# Patient Record
Sex: FEMALE | Race: WHITE | NOT HISPANIC OR LATINO | URBAN - METROPOLITAN AREA
[De-identification: names, ages, dates, MRNs, and addresses within clinical notes are randomized per-mention and may not be internally consistent; named-entity substitution may affect disease eponyms.]

---

## 2017-01-19 ENCOUNTER — FOLLOW UP ESTABLISHED (OUTPATIENT)
Dept: URBAN - METROPOLITAN AREA CLINIC 64 | Facility: CLINIC | Age: 77
End: 2017-01-19
Payer: MEDICARE

## 2017-01-19 DIAGNOSIS — H52.11 MYOPIA, RIGHT EYE: ICD-10-CM

## 2017-01-19 PROCEDURE — 92014 COMPRE OPH EXAM EST PT 1/>: CPT | Performed by: OPTOMETRIST

## 2017-01-19 PROCEDURE — 92015 DETERMINE REFRACTIVE STATE: CPT | Performed by: OPTOMETRIST

## 2017-01-19 ASSESSMENT — INTRAOCULAR PRESSURE
OS: 14
OD: 13

## 2017-01-19 ASSESSMENT — VISUAL ACUITY
OS: 20/20
OD: 20/20

## 2017-01-19 ASSESSMENT — KERATOMETRY
OS: 44.53
OD: 44.65

## 2017-09-22 ENCOUNTER — FOLLOW UP ESTABLISHED (OUTPATIENT)
Dept: URBAN - METROPOLITAN AREA CLINIC 64 | Facility: CLINIC | Age: 77
End: 2017-09-22
Payer: MEDICARE

## 2017-09-22 PROCEDURE — 92012 INTRM OPH EXAM EST PATIENT: CPT | Performed by: OPTOMETRIST

## 2017-09-22 RX ORDER — NEOMYCIN SULFATE, POLYMYXIN B SULFATE AND DEXAMETHASONE 3.5; 10000; 1 MG/ML; [USP'U]/ML; MG/ML
SUSPENSION OPHTHALMIC
Qty: 1 | Refills: 0 | Status: INACTIVE
Start: 2017-09-22 | End: 2018-05-10

## 2017-09-22 ASSESSMENT — INTRAOCULAR PRESSURE
OS: 12
OD: 12

## 2017-09-28 ENCOUNTER — FOLLOW UP ESTABLISHED (OUTPATIENT)
Dept: URBAN - METROPOLITAN AREA CLINIC 64 | Facility: CLINIC | Age: 77
End: 2017-09-28
Payer: MEDICARE

## 2017-09-28 PROCEDURE — 99213 OFFICE O/P EST LOW 20 MIN: CPT | Performed by: OPTOMETRIST

## 2017-09-28 ASSESSMENT — INTRAOCULAR PRESSURE
OD: 15
OS: 12

## 2017-10-25 ENCOUNTER — FOLLOW UP ESTABLISHED (OUTPATIENT)
Dept: URBAN - METROPOLITAN AREA CLINIC 64 | Facility: CLINIC | Age: 77
End: 2017-10-25
Payer: MEDICARE

## 2017-10-25 PROCEDURE — 99213 OFFICE O/P EST LOW 20 MIN: CPT | Performed by: OPTOMETRIST

## 2017-10-25 ASSESSMENT — INTRAOCULAR PRESSURE
OD: 10
OS: 10

## 2017-11-01 ENCOUNTER — FOLLOW UP ESTABLISHED (OUTPATIENT)
Dept: URBAN - METROPOLITAN AREA CLINIC 64 | Facility: CLINIC | Age: 77
End: 2017-11-01
Payer: MEDICARE

## 2017-11-01 PROCEDURE — 99213 OFFICE O/P EST LOW 20 MIN: CPT | Performed by: OPTOMETRIST

## 2017-11-01 ASSESSMENT — INTRAOCULAR PRESSURE
OD: 10
OS: 10

## 2017-11-15 ENCOUNTER — FOLLOW UP ESTABLISHED (OUTPATIENT)
Dept: URBAN - METROPOLITAN AREA CLINIC 64 | Facility: CLINIC | Age: 77
End: 2017-11-15
Payer: MEDICARE

## 2017-11-15 PROCEDURE — 99213 OFFICE O/P EST LOW 20 MIN: CPT | Performed by: OPTOMETRIST

## 2017-11-15 ASSESSMENT — INTRAOCULAR PRESSURE
OD: 12
OS: 12

## 2018-05-10 ENCOUNTER — FOLLOW UP ESTABLISHED (OUTPATIENT)
Dept: URBAN - METROPOLITAN AREA CLINIC 64 | Facility: CLINIC | Age: 78
End: 2018-05-10
Payer: MEDICARE

## 2018-05-10 DIAGNOSIS — H52.13 MYOPIA, BILATERAL: ICD-10-CM

## 2018-05-10 DIAGNOSIS — H26.493 OTHER SECONDARY CATARACT, BILATERAL: ICD-10-CM

## 2018-05-10 PROCEDURE — 92015 DETERMINE REFRACTIVE STATE: CPT | Performed by: OPTOMETRIST

## 2018-05-10 PROCEDURE — 92134 CPTRZ OPH DX IMG PST SGM RTA: CPT | Performed by: OPHTHALMOLOGY

## 2018-05-10 PROCEDURE — 92014 COMPRE OPH EXAM EST PT 1/>: CPT | Performed by: OPTOMETRIST

## 2018-05-10 PROCEDURE — 92134 CPTRZ OPH DX IMG PST SGM RTA: CPT | Performed by: OPTOMETRIST

## 2018-05-10 PROCEDURE — 92004 COMPRE OPH EXAM NEW PT 1/>: CPT | Performed by: OPHTHALMOLOGY

## 2018-05-10 PROCEDURE — 67028 INJECTION EYE DRUG: CPT | Performed by: OPHTHALMOLOGY

## 2018-05-10 ASSESSMENT — INTRAOCULAR PRESSURE
OS: 11
OD: 8
OD: 8
OS: 11

## 2018-05-10 ASSESSMENT — KERATOMETRY
OD: 44.60
OS: 44.35

## 2018-05-10 ASSESSMENT — VISUAL ACUITY
OD: 20/25
OS: 20/20

## 2018-05-22 ENCOUNTER — FOLLOW UP ESTABLISHED (OUTPATIENT)
Dept: URBAN - METROPOLITAN AREA CLINIC 64 | Facility: CLINIC | Age: 78
End: 2018-05-22
Payer: MEDICARE

## 2018-05-22 PROCEDURE — 92012 INTRM OPH EXAM EST PATIENT: CPT | Performed by: OPTOMETRIST

## 2018-05-22 RX ORDER — LOTEPREDNOL ETABONATE 5 MG/ML
0.5 % SUSPENSION/ DROPS OPHTHALMIC
Qty: 1 | Refills: 0 | Status: INACTIVE
Start: 2018-05-22 | End: 2018-06-06

## 2018-05-22 ASSESSMENT — INTRAOCULAR PRESSURE
OD: 8
OS: 9

## 2018-05-30 ENCOUNTER — FOLLOW UP ESTABLISHED (OUTPATIENT)
Dept: URBAN - METROPOLITAN AREA CLINIC 64 | Facility: CLINIC | Age: 78
End: 2018-05-30
Payer: MEDICARE

## 2018-05-30 DIAGNOSIS — H16.223 KERATOCONJUNCT SICCA, NOT SPECIFIED AS SJOGREN'S, BILATERAL: Primary | ICD-10-CM

## 2018-05-30 PROCEDURE — 99213 OFFICE O/P EST LOW 20 MIN: CPT | Performed by: OPTOMETRIST

## 2018-05-30 RX ORDER — LIFITEGRAST 50 MG/ML
5 % SOLUTION/ DROPS OPHTHALMIC
Qty: 90 | Refills: 0 | Status: INACTIVE
Start: 2018-05-30 | End: 2018-07-13

## 2018-06-06 ENCOUNTER — FOLLOW UP ESTABLISHED (OUTPATIENT)
Dept: URBAN - METROPOLITAN AREA CLINIC 10 | Facility: CLINIC | Age: 78
End: 2018-06-06
Payer: MEDICARE

## 2018-06-06 PROCEDURE — 67028 INJECTION EYE DRUG: CPT | Performed by: OPHTHALMOLOGY

## 2018-06-06 PROCEDURE — 92134 CPTRZ OPH DX IMG PST SGM RTA: CPT | Performed by: OPHTHALMOLOGY

## 2018-06-06 ASSESSMENT — INTRAOCULAR PRESSURE
OD: 11
OS: 10

## 2018-06-19 ENCOUNTER — FOLLOW UP ESTABLISHED (OUTPATIENT)
Dept: URBAN - METROPOLITAN AREA CLINIC 64 | Facility: CLINIC | Age: 78
End: 2018-06-19
Payer: MEDICARE

## 2018-06-19 PROCEDURE — 99213 OFFICE O/P EST LOW 20 MIN: CPT | Performed by: OPTOMETRIST

## 2018-06-19 ASSESSMENT — INTRAOCULAR PRESSURE
OS: 8
OD: 10

## 2018-07-05 ENCOUNTER — FOLLOW UP ESTABLISHED (OUTPATIENT)
Dept: URBAN - METROPOLITAN AREA CLINIC 64 | Facility: CLINIC | Age: 78
End: 2018-07-05
Payer: MEDICARE

## 2018-07-05 PROCEDURE — 67028 INJECTION EYE DRUG: CPT | Performed by: OPHTHALMOLOGY

## 2018-07-05 PROCEDURE — 92134 CPTRZ OPH DX IMG PST SGM RTA: CPT | Performed by: OPHTHALMOLOGY

## 2018-07-05 ASSESSMENT — INTRAOCULAR PRESSURE
OS: 9
OD: 8

## 2018-08-16 ENCOUNTER — FOLLOW UP ESTABLISHED (OUTPATIENT)
Dept: URBAN - METROPOLITAN AREA CLINIC 64 | Facility: CLINIC | Age: 78
End: 2018-08-16
Payer: MEDICARE

## 2018-08-16 DIAGNOSIS — H35.3211 EXDTVE AGE-REL MCLR DEGN, RIGHT EYE, WITH ACTV CHRDL NEOVAS: Primary | ICD-10-CM

## 2018-08-16 PROCEDURE — 92004 COMPRE OPH EXAM NEW PT 1/>: CPT | Performed by: OPHTHALMOLOGY

## 2018-08-16 PROCEDURE — 92235 FLUORESCEIN ANGRPH MLTIFRAME: CPT | Performed by: OPHTHALMOLOGY

## 2018-08-16 PROCEDURE — 92014 COMPRE OPH EXAM EST PT 1/>: CPT | Performed by: OPHTHALMOLOGY

## 2018-08-16 PROCEDURE — 92134 CPTRZ OPH DX IMG PST SGM RTA: CPT | Performed by: OPHTHALMOLOGY

## 2018-08-16 ASSESSMENT — INTRAOCULAR PRESSURE
OS: 12
OD: 12

## 2018-11-08 ENCOUNTER — RX CHECK (OUTPATIENT)
Dept: URBAN - METROPOLITAN AREA CLINIC 64 | Facility: CLINIC | Age: 78
End: 2018-11-08
Payer: MEDICARE

## 2018-11-08 PROCEDURE — 92014 COMPRE OPH EXAM EST PT 1/>: CPT | Performed by: OPHTHALMOLOGY

## 2018-11-08 PROCEDURE — 92134 CPTRZ OPH DX IMG PST SGM RTA: CPT | Performed by: OPHTHALMOLOGY

## 2018-11-08 ASSESSMENT — INTRAOCULAR PRESSURE
OD: 13
OS: 11

## 2019-01-31 ENCOUNTER — FOLLOW UP ESTABLISHED (OUTPATIENT)
Dept: URBAN - METROPOLITAN AREA CLINIC 64 | Facility: CLINIC | Age: 79
End: 2019-01-31
Payer: MEDICARE

## 2019-01-31 PROCEDURE — 92014 COMPRE OPH EXAM EST PT 1/>: CPT | Performed by: OPHTHALMOLOGY

## 2019-01-31 PROCEDURE — 92134 CPTRZ OPH DX IMG PST SGM RTA: CPT | Performed by: OPHTHALMOLOGY

## 2019-01-31 ASSESSMENT — INTRAOCULAR PRESSURE
OD: 10
OS: 12

## 2021-09-29 ENCOUNTER — OFFICE VISIT (OUTPATIENT)
Dept: URBAN - METROPOLITAN AREA CLINIC 15 | Facility: CLINIC | Age: 81
End: 2021-09-29
Payer: MEDICARE

## 2021-09-29 DIAGNOSIS — H35.3131 NONEXUDATIVE AGE-RELATED MACULAR DEGENERATION, BILATERAL, EARLY DRY STAGE: ICD-10-CM

## 2021-09-29 DIAGNOSIS — E11.9 TYPE 2 DIABETES MELLITUS WITHOUT COMPLICATIONS: Primary | ICD-10-CM

## 2021-09-29 PROCEDURE — 92134 CPTRZ OPH DX IMG PST SGM RTA: CPT | Performed by: OPHTHALMOLOGY

## 2021-09-29 PROCEDURE — 92014 COMPRE OPH EXAM EST PT 1/>: CPT | Performed by: OPHTHALMOLOGY

## 2021-09-29 ASSESSMENT — INTRAOCULAR PRESSURE
OS: 15
OS: 13
OD: 18
OD: 14

## 2021-09-29 NOTE — IMPRESSION/PLAN
Impression: Type 2 diabetes mellitus without complications: I98.2. Plan: Diabetes: no background retinopathy, no signs of neovascularization noted. Discussed ocular and systemic benefits of blood sugar control. MAC OCT obtained today and reviewed with patient, no DME OU.

## 2023-04-19 ENCOUNTER — OFFICE VISIT (OUTPATIENT)
Dept: URBAN - METROPOLITAN AREA CLINIC 15 | Facility: CLINIC | Age: 83
End: 2023-04-19
Payer: MEDICARE

## 2023-04-19 DIAGNOSIS — H35.3111 NEXDTVE AGE-RELATED MCLR DEGN, RIGHT EYE, EARLY DRY STAGE: ICD-10-CM

## 2023-04-19 DIAGNOSIS — E11.9 TYPE 2 DIABETES MELLITUS WITHOUT COMPLICATIONS: Primary | ICD-10-CM

## 2023-04-19 PROCEDURE — 92134 CPTRZ OPH DX IMG PST SGM RTA: CPT | Performed by: OPHTHALMOLOGY

## 2023-04-19 PROCEDURE — 99214 OFFICE O/P EST MOD 30 MIN: CPT | Performed by: OPHTHALMOLOGY

## 2023-04-19 ASSESSMENT — INTRAOCULAR PRESSURE
OS: 15
OD: 15

## 2023-04-19 NOTE — IMPRESSION/PLAN
Impression: Type 2 diabetes mellitus without complications: A94.1. Plan: Diabetes: no background retinopathy, no signs of neovascularization noted. Discussed ocular and systemic benefits of blood sugar control. MAC OCT obtained today and reviewed with patient, no DME OU.

## 2024-09-09 ENCOUNTER — OFFICE VISIT (OUTPATIENT)
Dept: URBAN - METROPOLITAN AREA CLINIC 15 | Facility: CLINIC | Age: 84
End: 2024-09-09
Payer: MEDICARE

## 2024-09-09 DIAGNOSIS — Z96.1 PRESENCE OF PSEUDOPHAKIA: ICD-10-CM

## 2024-09-09 DIAGNOSIS — H02.88A MEIBOMIAN GLAND DYSFNCT RIGHT EYE, UPPER AND LOWER EYELIDS: ICD-10-CM

## 2024-09-09 DIAGNOSIS — H16.223 KERATOCONJUNCTIVITIS SICCA, NOT SPECIFIED AS SJ”GREN'S, BILATERAL: ICD-10-CM

## 2024-09-09 DIAGNOSIS — H02.88B MEIBOMIAN GLAND DYSFNCT LEFT EYE, UPPER AND LOWER EYELIDS: ICD-10-CM

## 2024-09-09 DIAGNOSIS — H00.015 HORDEOLUM EXTERNUM LEFT LOWER EYELID: Primary | ICD-10-CM

## 2024-09-09 PROCEDURE — 99204 OFFICE O/P NEW MOD 45 MIN: CPT

## 2024-09-09 RX ORDER — DOXYCYCLINE HYCLATE 100 MG/1
100 MG TABLET, COATED ORAL
Qty: 16 | Refills: 0 | Status: ACTIVE
Start: 2024-09-09

## 2024-09-09 ASSESSMENT — INTRAOCULAR PRESSURE
OD: 19
OS: 15

## 2024-09-12 ENCOUNTER — OFFICE VISIT (OUTPATIENT)
Dept: URBAN - METROPOLITAN AREA CLINIC 15 | Facility: CLINIC | Age: 84
End: 2024-09-12
Payer: MEDICARE

## 2024-09-12 PROCEDURE — 99213 OFFICE O/P EST LOW 20 MIN: CPT

## 2024-09-12 ASSESSMENT — INTRAOCULAR PRESSURE
OS: 12
OD: 17

## 2024-09-26 ENCOUNTER — OFFICE VISIT (OUTPATIENT)
Dept: URBAN - METROPOLITAN AREA CLINIC 15 | Facility: CLINIC | Age: 84
End: 2024-09-26
Payer: MEDICARE

## 2024-09-26 DIAGNOSIS — H00.015 HORDEOLUM EXTERNUM LEFT LOWER EYELID: Primary | ICD-10-CM

## 2024-09-26 DIAGNOSIS — H02.88A MEIBOMIAN GLAND DYSFNCT RIGHT EYE, UPPER AND LOWER EYELIDS: ICD-10-CM

## 2024-09-26 DIAGNOSIS — H02.88B MEIBOMIAN GLAND DYSFNCT LEFT EYE, UPPER AND LOWER EYELIDS: ICD-10-CM

## 2024-09-26 PROCEDURE — 99213 OFFICE O/P EST LOW 20 MIN: CPT

## 2024-09-26 ASSESSMENT — INTRAOCULAR PRESSURE
OS: 13
OD: 13

## 2024-10-10 ENCOUNTER — OFFICE VISIT (OUTPATIENT)
Dept: URBAN - METROPOLITAN AREA CLINIC 15 | Facility: CLINIC | Age: 84
End: 2024-10-10
Payer: MEDICARE

## 2024-10-10 DIAGNOSIS — H00.015 HORDEOLUM EXTERNUM LEFT LOWER EYELID: ICD-10-CM

## 2024-10-10 DIAGNOSIS — H10.9 CONJUNCTIVITIS: Primary | ICD-10-CM

## 2024-10-10 PROCEDURE — 99214 OFFICE O/P EST MOD 30 MIN: CPT

## 2024-10-10 RX ORDER — DOXYCYCLINE HYCLATE 100 MG/1
100 MG TABLET, COATED ORAL
Qty: 14 | Refills: 0 | Status: ACTIVE
Start: 2024-10-10

## 2024-10-10 RX ORDER — TOBRAMYCIN 3 MG/ML
0.3 % SOLUTION OPHTHALMIC
Qty: 5 | Refills: 0 | Status: ACTIVE
Start: 2024-10-10

## 2024-10-10 ASSESSMENT — INTRAOCULAR PRESSURE
OD: 16
OS: 12

## 2024-10-15 ENCOUNTER — OFFICE VISIT (OUTPATIENT)
Dept: URBAN - METROPOLITAN AREA CLINIC 15 | Facility: CLINIC | Age: 84
End: 2024-10-15
Payer: MEDICARE

## 2024-10-15 DIAGNOSIS — H10.9 CONJUNCTIVITIS: Primary | ICD-10-CM

## 2024-10-15 PROCEDURE — 99214 OFFICE O/P EST MOD 30 MIN: CPT

## 2024-10-15 RX ORDER — PREDNISOLONE ACETATE 10 MG/ML
1 % SUSPENSION/ DROPS OPHTHALMIC
Qty: 5 | Refills: 0 | Status: ACTIVE
Start: 2024-10-15

## 2024-10-15 RX ORDER — AZITHROMYCIN MONOHYDRATE 500 MG/1
500 MG TABLET, FILM COATED ORAL
Qty: 6 | Refills: 0 | Status: INACTIVE
Start: 2024-10-15 | End: 2024-10-17

## 2024-10-15 ASSESSMENT — INTRAOCULAR PRESSURE: OD: 15

## 2024-10-17 DIAGNOSIS — H02.88B MEIBOMIAN GLAND DYSFNCT LEFT EYE, UPPER AND LOWER EYELIDS: ICD-10-CM

## 2024-10-17 PROCEDURE — 99214 OFFICE O/P EST MOD 30 MIN: CPT

## 2024-10-17 ASSESSMENT — INTRAOCULAR PRESSURE: OD: 20

## 2024-11-08 ENCOUNTER — OFFICE VISIT (OUTPATIENT)
Dept: URBAN - METROPOLITAN AREA CLINIC 15 | Facility: CLINIC | Age: 84
End: 2024-11-08
Payer: MEDICARE

## 2024-11-08 DIAGNOSIS — H10.9 CONJUNCTIVITIS: Primary | ICD-10-CM

## 2024-11-08 PROCEDURE — 92012 INTRM OPH EXAM EST PATIENT: CPT | Performed by: OPHTHALMOLOGY

## 2024-11-08 ASSESSMENT — INTRAOCULAR PRESSURE
OD: 12
OS: 12

## 2024-11-26 ENCOUNTER — RX ONLY (OUTPATIENT)
Age: 84
Setting detail: RX ONLY
End: 2024-11-26

## 2024-11-26 ENCOUNTER — APPOINTMENT (RX ONLY)
Dept: URBAN - METROPOLITAN AREA CLINIC 140 | Facility: CLINIC | Age: 84
Setting detail: DERMATOLOGY
End: 2024-11-26

## 2024-11-26 DIAGNOSIS — L82.0 INFLAMED SEBORRHEIC KERATOSIS: ICD-10-CM

## 2024-11-26 DIAGNOSIS — L57.0 ACTINIC KERATOSIS: ICD-10-CM

## 2024-11-26 DIAGNOSIS — L21.8 OTHER SEBORRHEIC DERMATITIS: ICD-10-CM | Status: INADEQUATELY CONTROLLED

## 2024-11-26 DIAGNOSIS — L30.9 DERMATITIS, UNSPECIFIED: ICD-10-CM

## 2024-11-26 PROCEDURE — 17000 DESTRUCT PREMALG LESION: CPT | Mod: 59

## 2024-11-26 PROCEDURE — 99204 OFFICE O/P NEW MOD 45 MIN: CPT | Mod: 25

## 2024-11-26 PROCEDURE — ? LIQUID NITROGEN

## 2024-11-26 PROCEDURE — ? COUNSELING

## 2024-11-26 PROCEDURE — ? PRESCRIPTION

## 2024-11-26 PROCEDURE — 17003 DESTRUCT PREMALG LES 2-14: CPT | Mod: 59

## 2024-11-26 PROCEDURE — 17110 DESTRUCTION B9 LES UP TO 14: CPT

## 2024-11-26 RX ORDER — KETOCONAZOLE 20 MG/ML
SHAMPOO, SUSPENSION TOPICAL BIW
Qty: 120 | Refills: 6 | COMMUNITY
Start: 2024-11-26

## 2024-11-26 RX ORDER — TRIAMCINOLONE ACETONIDE 1 MG/G
CREAM TOPICAL BID
Qty: 80 | Refills: 1

## 2024-11-26 RX ORDER — KETOCONAZOLE 20 MG/ML
SHAMPOO, SUSPENSION TOPICAL BIW
Qty: 120 | Refills: 6

## 2024-11-26 RX ORDER — TRIAMCINOLONE ACETONIDE 1 MG/G
CREAM TOPICAL BID
Qty: 80 | Refills: 1 | COMMUNITY
Start: 2024-11-26

## 2024-11-26 RX ADMIN — TRIAMCINOLONE ACETONIDE: 1 CREAM TOPICAL at 00:00

## 2024-11-26 RX ADMIN — KETOCONAZOLE: 20 SHAMPOO, SUSPENSION TOPICAL at 00:00

## 2024-11-26 ASSESSMENT — LOCATION ZONE DERM
LOCATION ZONE: SCALP
LOCATION ZONE: NOSE
LOCATION ZONE: LEG
LOCATION ZONE: FACE

## 2024-11-26 ASSESSMENT — LOCATION SIMPLE DESCRIPTION DERM
LOCATION SIMPLE: RIGHT ZYGOMA
LOCATION SIMPLE: NOSE
LOCATION SIMPLE: LEFT POSTERIOR THIGH
LOCATION SIMPLE: RIGHT POSTERIOR THIGH
LOCATION SIMPLE: SCALP
LOCATION SIMPLE: RIGHT CHEEK

## 2024-11-26 ASSESSMENT — LOCATION DETAILED DESCRIPTION DERM
LOCATION DETAILED: RIGHT DISTAL POSTERIOR THIGH
LOCATION DETAILED: RIGHT CENTRAL MALAR CHEEK
LOCATION DETAILED: NASAL DORSUM
LOCATION DETAILED: LEFT DISTAL POSTERIOR THIGH
LOCATION DETAILED: LEFT SUPERIOR PARIETAL SCALP
LOCATION DETAILED: RIGHT CENTRAL ZYGOMA

## 2024-11-26 NOTE — PROCEDURE: LIQUID NITROGEN
Render Post-Care Instructions In Note?: no
Post-Care Instructions: I reviewed with the patient in detail post-care instructions. Patient is to wear sunprotection, and avoid picking at any of the treated lesions. Pt may apply Vaseline to crusted or scabbing areas.
Medical Necessity Information: It is in your best interest to select a reason for this procedure from the list below. All of these items fulfill various CMS LCD requirements except the new and changing color options.
Spray Paint Text: The liquid nitrogen was applied to the skin utilizing a spray paint frosting technique.
Medical Necessity Clause: This procedure was medically necessary because the lesions that were treated were:
Show Topical Anesthesia Variable?: Yes
Consent: The patient's consent was obtained including but not limited to risks of crusting, scabbing, blistering, scarring, darker or lighter pigmentary change, recurrence, incomplete removal and infection.
Detail Level: Detailed
Duration Of Freeze Thaw-Cycle (Seconds): 0